# Patient Record
Sex: MALE | NOT HISPANIC OR LATINO | Employment: FULL TIME | URBAN - METROPOLITAN AREA
[De-identification: names, ages, dates, MRNs, and addresses within clinical notes are randomized per-mention and may not be internally consistent; named-entity substitution may affect disease eponyms.]

---

## 2019-08-15 ENCOUNTER — APPOINTMENT (OUTPATIENT)
Dept: GENERAL RADIOLOGY | Facility: HOSPITAL | Age: 49
End: 2019-08-15

## 2019-08-15 ENCOUNTER — HOSPITAL ENCOUNTER (EMERGENCY)
Facility: HOSPITAL | Age: 49
Discharge: HOME | End: 2019-08-15
Admitting: EMERGENCY MEDICINE

## 2019-08-15 VITALS
SYSTOLIC BLOOD PRESSURE: 137 MMHG | OXYGEN SATURATION: 96 % | TEMPERATURE: 98.4 F | HEIGHT: 72 IN | DIASTOLIC BLOOD PRESSURE: 86 MMHG | RESPIRATION RATE: 14 BRPM | BODY MASS INDEX: 40.1 KG/M2 | HEART RATE: 59 BPM | WEIGHT: 296.08 LBS

## 2019-08-15 DIAGNOSIS — M76.62 ACHILLES TENDINITIS OF LEFT LOWER EXTREMITY: Primary | ICD-10-CM

## 2019-08-15 PROCEDURE — 73590 X-RAY EXAM OF LOWER LEG: CPT

## 2019-08-15 PROCEDURE — 99283 EMERGENCY DEPT VISIT LOW MDM: CPT

## 2019-08-15 RX ORDER — HYDROCODONE BITARTRATE AND ACETAMINOPHEN 7.5; 325 MG/1; MG/1
1 TABLET ORAL ONCE
Status: COMPLETED | OUTPATIENT
Start: 2019-08-15 | End: 2019-08-15

## 2019-08-15 RX ORDER — METHYLPREDNISOLONE 4 MG/1
TABLET ORAL
Qty: 21 TABLET | Refills: 0 | OUTPATIENT
Start: 2019-08-15 | End: 2020-08-10

## 2019-08-15 RX ADMIN — HYDROCODONE BITARTRATE AND ACETAMINOPHEN 1 TABLET: 7.5; 325 TABLET ORAL at 13:15

## 2019-08-15 NOTE — DISCHARGE INSTRUCTIONS
Wear Ace wrap as directed.  Apply heat every 2 hours while awake, on for 20 minutes.  Take Medrol Dosepak as prescribed.  Follow-up with PCP for primary care needs.  Follow-up with orthopedics if no improvement in the next week.  Return to the ER for new or worsening symptoms.

## 2019-08-15 NOTE — ED NOTES
"Left ankle injury s/p \"rolling down a hill\" x 4-5 days.  Able to bear weight with pain.  Sts unable to wear safety boot for work.     Jazmyn Hunter RN  08/15/19 5296    "

## 2020-02-09 ENCOUNTER — APPOINTMENT (OUTPATIENT)
Dept: GENERAL RADIOLOGY | Facility: HOSPITAL | Age: 50
End: 2020-02-09

## 2020-02-09 ENCOUNTER — HOSPITAL ENCOUNTER (EMERGENCY)
Facility: HOSPITAL | Age: 50
Discharge: HOME | End: 2020-02-09
Admitting: EMERGENCY MEDICINE

## 2020-02-09 VITALS
BODY MASS INDEX: 41.51 KG/M2 | SYSTOLIC BLOOD PRESSURE: 138 MMHG | WEIGHT: 306.44 LBS | HEART RATE: 69 BPM | RESPIRATION RATE: 19 BRPM | OXYGEN SATURATION: 98 % | DIASTOLIC BLOOD PRESSURE: 78 MMHG | HEIGHT: 72 IN | TEMPERATURE: 98 F

## 2020-02-09 DIAGNOSIS — M25.522 LEFT ELBOW PAIN: Primary | ICD-10-CM

## 2020-02-09 DIAGNOSIS — M10.9 GOUT OF LEFT ELBOW, UNSPECIFIED CAUSE, UNSPECIFIED CHRONICITY: ICD-10-CM

## 2020-02-09 PROCEDURE — 99282 EMERGENCY DEPT VISIT SF MDM: CPT

## 2020-02-09 PROCEDURE — 73070 X-RAY EXAM OF ELBOW: CPT

## 2020-02-09 RX ORDER — KETOROLAC TROMETHAMINE 30 MG/ML
30 INJECTION, SOLUTION INTRAMUSCULAR; INTRAVENOUS ONCE
Status: DISCONTINUED | OUTPATIENT
Start: 2020-02-09 | End: 2020-02-09

## 2020-02-09 RX ORDER — INDOMETHACIN 25 MG/1
50 CAPSULE ORAL
Qty: 42 CAPSULE | Refills: 0 | Status: SHIPPED | OUTPATIENT
Start: 2020-02-09 | End: 2020-02-16

## 2020-02-09 RX ORDER — SODIUM CHLORIDE 0.9 % (FLUSH) 0.9 %
10 SYRINGE (ML) INJECTION AS NEEDED
Status: DISCONTINUED | OUTPATIENT
Start: 2020-02-09 | End: 2020-02-09

## 2020-02-09 NOTE — DISCHARGE INSTRUCTIONS
Take indomethacin as prescribed.  Be sure to take full course.    Follow-up with your primary care provider in 3-5 days.  If you do not have a primary care provider call [phone] SOURCE for help in finding one, or you may follow up with [facility] at [phone].    Return to ED for any new or worsening symptoms

## 2020-02-09 NOTE — ED PROVIDER NOTES
"Subjective   History of Present Illness  Patient is a 49-year-old male [facility] significant for gout who presents with complaints of \"gouty attack in left elbow\" they first noticed 2 days ago.  Patient describes the pain as a constant throbbing type pain that he rates a 6/10 severity.  Denies any radiation of the pain from his elbow states is worse with certain movements better with rest.  Patient states he is taken no medications for symptoms.  He denies any fever nausea vomiting body aches or chills chest pain or shortness of breath.  Does report some warmth to his elbow that is noticed but denies any redness.  Patient states that he does have a history of gout in this elbow has been off of his indomethacin for many years now has not had a gouty attack in 2 years.  Review of Systems   Constitutional: Negative.    Respiratory: Negative.    Cardiovascular: Negative.    Gastrointestinal: Positive for abdominal pain. Negative for nausea and vomiting.   Musculoskeletal: Positive for arthralgias and joint swelling. Negative for neck pain and neck stiffness.   Skin: Negative for color change, rash and wound.   Neurological: Negative.        Past Medical History:   Diagnosis Date   • Alopecia    • Edema    • Fatigue    • Gout    • Joint pain    • AMARJIT (obstructive sleep apnea)        No Known Allergies    Past Surgical History:   Procedure Laterality Date   • TONSILLECTOMY         Family History   Problem Relation Age of Onset   • Heart attack Mother    • Cancer Father    • Cancer Maternal Grandmother    • Sleep apnea Paternal Grandmother    • Heart disease Paternal Grandmother    • Stroke Paternal Grandmother    • Obesity Paternal Grandfather    • Heart attack Paternal Grandfather    • Heart disease Paternal Grandfather    • Sleep apnea Paternal Grandfather        Social History     Socioeconomic History   • Marital status:      Spouse name: Not on file   • Number of children: Not on file   • Years of education: Not on " "file   • Highest education level: Not on file   Tobacco Use   • Smoking status: Never Smoker   • Smokeless tobacco: Never Used   Substance and Sexual Activity   • Alcohol use: Yes     Comment: Occasionally   • Drug use: Never   • Sexual activity: Defer           Objective   Physical Exam   Constitutional: He is oriented to person, place, and time. He appears well-developed and well-nourished. No distress.   HENT:   Head: Normocephalic and atraumatic.   Mouth/Throat: Oropharynx is clear and moist.   Eyes: Pupils are equal, round, and reactive to light. EOM are normal. No scleral icterus.   Cardiovascular: Normal rate, regular rhythm, normal heart sounds and intact distal pulses. Exam reveals no gallop and no friction rub.   No murmur heard.  Pulmonary/Chest: Effort normal and breath sounds normal. No stridor. He has no wheezes. He has no rales.   Musculoskeletal: He exhibits tenderness. He exhibits no deformity.        Left shoulder: Normal.        Right elbow: Normal.       Left elbow: He exhibits decreased range of motion and swelling. He exhibits no deformity and no laceration. Tenderness found. Olecranon process tenderness noted.        Left wrist: He exhibits normal range of motion, no tenderness, no swelling, no effusion, no crepitus and no laceration.   Peripheral pulses intact in upper extremities compartments are soft swelling or warmth noted to the left elbow compared to the right no overlying erythema.   Neurological: He is alert and oriented to person, place, and time. No cranial nerve deficit or sensory deficit.   Skin: Skin is warm. Capillary refill takes less than 2 seconds. No rash noted. He is not diaphoretic. No erythema. No pallor.   Psychiatric: He has a normal mood and affect. His behavior is normal.   Nursing note and vitals reviewed.      Procedures           ED Course    /89   Pulse 71   Temp 98.1 °F (36.7 °C) (Oral)   Resp 18   Ht 182.9 cm (72\")   Wt (!) 139 kg (306 lb 7 oz)   " SpO2 97%   BMI 41.56 kg/m²   Medications - No data to display  Labs Reviewed - No data to display.Xr Elbow 2 View Left    Result Date: 2/9/2020  Normal elbow radiograph exam.  Electronically Signed By-Jerry Kern Jr. On:2/9/2020 2:17 PM This report was finalized on [id number] by  Jerry Kern Jr., .                                               MDM  Number of Diagnoses or Management Options  Gout of left elbow, unspecified cause, unspecified chronicity:   Left elbow pain:   Diagnosis management comments: Chart Review:  Comorbidity: Gout hypertension  Differentials: Fracture dislocation gout contusion sprain strain effusion septic arthritis     ;this list is not all inclusive and does not constitute the entirety of considered causes    Labs: Patient refused uric acid ESR sed rate  Imaging: Was interpreted by physician and reviewed by myself:  Xr Elbow 2 View Left  Result Date: 2/9/2020  Normal elbow radiograph exam.  Electronically Signed By-Jerry Kern Jr. On:2/9/2020 2:17 PM This report was finalized on [id number] by  Jerry Kern Jr., .    Disposition/Treatment:  While in the ED patient refused blood work and pain medication stating that he knows this is his gout and he just wants a refill on his medication risk and benefits were discussed with the patient in regards to this etc. showed no acute osseous abnormalities he was afebrile and appeared nontoxic patient was given prescription for indomethacin.  Lab results and findings were discussed with the patient and family at bedside who voiced understanding of discharge instructions along with signs and symptoms requiring return to the ED.  Upon discharged patient was in stable condition with followup for a revaluation.  D       Amount and/or Complexity of Data Reviewed  Tests in the radiology section of CPT®: reviewed        Final diagnoses:   Left elbow pain   Gout of left elbow, unspecified cause, unspecified chronicity            Agueda Crystal,  PA  02/09/20 1458

## 2020-02-09 NOTE — ED NOTES
Pt reports he does not want lab work and IV if it can be avoided. Spoke with PA.     Nadia Weaver, QUIANA  02/09/20 2886

## 2020-10-17 ENCOUNTER — HOSPITAL ENCOUNTER (EMERGENCY)
Facility: HOSPITAL | Age: 50
Discharge: HOME | End: 2020-10-17
Admitting: EMERGENCY MEDICINE

## 2020-10-17 VITALS
SYSTOLIC BLOOD PRESSURE: 134 MMHG | HEART RATE: 87 BPM | WEIGHT: 315 LBS | OXYGEN SATURATION: 95 % | HEIGHT: 72 IN | BODY MASS INDEX: 42.66 KG/M2 | DIASTOLIC BLOOD PRESSURE: 90 MMHG | RESPIRATION RATE: 16 BRPM | TEMPERATURE: 98 F

## 2020-10-17 DIAGNOSIS — M25.562 ACUTE PAIN OF LEFT KNEE: Primary | ICD-10-CM

## 2020-10-17 DIAGNOSIS — M25.522 LEFT ELBOW PAIN: ICD-10-CM

## 2020-10-17 PROCEDURE — 99282 EMERGENCY DEPT VISIT SF MDM: CPT

## 2020-10-17 RX ORDER — INDOMETHACIN 50 MG/1
50 CAPSULE ORAL
Qty: 30 CAPSULE | Refills: 0 | Status: SHIPPED | OUTPATIENT
Start: 2020-10-17 | End: 2020-10-27

## 2020-10-17 NOTE — DISCHARGE INSTRUCTIONS
Return to the ER for any worsening symptoms.  Follow-up with primary care provider if you do not have 1 see the above referrals.  Would recommend following up in order to discuss initiating treatment for recurrent gout once you are out of your acute episode

## 2020-10-17 NOTE — ED PROVIDER NOTES
Subjective   History: Patient is a 50-year-old male who presents to the ER with left knee and left elbow pain.  He reports that he has a history of gout and he has flareups about once a month now they have started increasing he has not seen a primary care provider for this but has been diagnosed with gout before he reports that normally it starts in his left foot and moves upward so this is slightly different initial onset but it feels very similar.  Denies any fevers chills nausea or vomiting denies any joint tenderness or warmth to touch.      Onset: Several days  Location: Left knee and elbow  Duration: Constant  Character: Sharp pain  Aggravating/Alleviating factors: None  Radiation none  Severity: Moderate            Review of Systems   Constitutional: Negative for chills, diaphoresis, fatigue and fever.   Respiratory: Negative for cough, choking and shortness of breath.    Cardiovascular: Negative for chest pain.   Gastrointestinal: Negative for abdominal distention, abdominal pain, nausea and vomiting.   Genitourinary: Negative.    Musculoskeletal: Negative for joint swelling.        Left knee pain  Left elbow pain   Skin: Negative.    Neurological: Negative.    Psychiatric/Behavioral: Negative.        Past Medical History:   Diagnosis Date   • Alopecia    • Edema    • Fatigue    • Gout    • Joint pain    • AMARJIT (obstructive sleep apnea)        No Known Allergies    Past Surgical History:   Procedure Laterality Date   • TONSILLECTOMY         Family History   Problem Relation Age of Onset   • Heart attack Mother    • Cancer Father    • Cancer Maternal Grandmother    • Sleep apnea Paternal Grandmother    • Heart disease Paternal Grandmother    • Stroke Paternal Grandmother    • Obesity Paternal Grandfather    • Heart attack Paternal Grandfather    • Heart disease Paternal Grandfather    • Sleep apnea Paternal Grandfather        Social History     Socioeconomic History   • Marital status:      Spouse  name: Not on file   • Number of children: Not on file   • Years of education: Not on file   • Highest education level: Not on file   Tobacco Use   • Smoking status: Never Smoker   • Smokeless tobacco: Never Used   Substance and Sexual Activity   • Alcohol use: Yes     Comment: Occasionally   • Drug use: Never   • Sexual activity: Defer           Objective   Physical Exam  Vitals signs and nursing note reviewed.   Constitutional:       Appearance: He is well-developed.   HENT:      Head: Normocephalic and atraumatic.      Nose: Nose normal.   Eyes:      Pupils: Pupils are equal, round, and reactive to light.   Neck:      Musculoskeletal: Normal range of motion.   Pulmonary:      Effort: Pulmonary effort is normal.   Musculoskeletal: Normal range of motion.      Comments: No obvious edema noted to left elbow or left knee.  Not warm to touch no erythema.  Patient reports that he is able to ambulate without difficulty.   Skin:     General: Skin is warm and dry.   Neurological:      General: No focal deficit present.      Mental Status: He is alert and oriented to person, place, and time.   Psychiatric:         Mood and Affect: Mood normal.         Behavior: Behavior normal.         Thought Content: Thought content normal.         Judgment: Judgment normal.         Procedures           ED Course           No radiology results for the last day  Labs Reviewed - No data to display  Medications   predniSONE (DELTASONE) tablet 60 mg (has no administration in time range)                                     MDM  Number of Diagnoses or Management Options  Acute pain of left knee:   Left elbow pain:   Diagnosis management comments: I examined the patient using the appropriate personal protective equipment.      DISPOSITION:   Chart Review:  Comorbidity:  has a past medical history of Alopecia, Edema, Fatigue, Gout, Joint pain, and AMARJIT (obstructive sleep apnea).  Differentials:this list is not all inclusive and does not constitute  the entirety of considered causes --> Gout flareup, septic arthritis, muscular strain,    Imaging: Was interpreted by physician and reviewed by myself:  No radiology results for the last day    Disposition/Treatment:    Patient is a 50-year-old male who presents to the ER with left knee and elbow pain he reports that he feels like this is a gout flareup he has a history of gout he was given a dose of prednisone here in the ER and then started on indomethacin he was told to follow-up primary care provider for possible initiation of allopurinol in the outpatient setting.  He was stable at time of discharge.  Return precaution follow-up instructions provided and patient was in agreement with plan.    Patient Progress  Patient progress: stable      Final diagnoses:   Acute pain of left knee   Left elbow pain            Jennifer Naylor PA-C  10/17/20 1837

## 2021-01-31 ENCOUNTER — HOSPITAL ENCOUNTER (EMERGENCY)
Facility: HOSPITAL | Age: 51
Discharge: HOME | End: 2021-01-31
Admitting: EMERGENCY MEDICINE

## 2021-01-31 VITALS
OXYGEN SATURATION: 94 % | HEART RATE: 90 BPM | SYSTOLIC BLOOD PRESSURE: 138 MMHG | WEIGHT: 315 LBS | HEIGHT: 72 IN | RESPIRATION RATE: 16 BRPM | BODY MASS INDEX: 42.66 KG/M2 | DIASTOLIC BLOOD PRESSURE: 88 MMHG | TEMPERATURE: 98.3 F

## 2021-01-31 DIAGNOSIS — M10.9 GOUT OF BIG TOE: Primary | ICD-10-CM

## 2021-01-31 PROCEDURE — 99282 EMERGENCY DEPT VISIT SF MDM: CPT

## 2021-01-31 RX ORDER — INDOMETHACIN 50 MG/1
50 CAPSULE ORAL
Qty: 30 CAPSULE | Refills: 0 | Status: SHIPPED | OUTPATIENT
Start: 2021-01-31 | End: 2021-02-10

## 2021-02-01 ENCOUNTER — EPISODE CHANGES (OUTPATIENT)
Dept: CASE MANAGEMENT | Facility: OTHER | Age: 51
End: 2021-02-01

## 2021-03-14 ENCOUNTER — HOSPITAL ENCOUNTER (EMERGENCY)
Facility: HOSPITAL | Age: 51
Discharge: HOME | End: 2021-03-14
Attending: EMERGENCY MEDICINE | Admitting: EMERGENCY MEDICINE

## 2021-03-14 ENCOUNTER — APPOINTMENT (OUTPATIENT)
Dept: GENERAL RADIOLOGY | Facility: HOSPITAL | Age: 51
End: 2021-03-14

## 2021-03-14 VITALS
WEIGHT: 315 LBS | SYSTOLIC BLOOD PRESSURE: 160 MMHG | OXYGEN SATURATION: 98 % | BODY MASS INDEX: 42.66 KG/M2 | HEIGHT: 72 IN | DIASTOLIC BLOOD PRESSURE: 82 MMHG | RESPIRATION RATE: 18 BRPM | HEART RATE: 64 BPM | TEMPERATURE: 98.4 F

## 2021-03-14 DIAGNOSIS — M10.9 ACUTE GOUT OF LEFT FOOT, UNSPECIFIED CAUSE: ICD-10-CM

## 2021-03-14 DIAGNOSIS — M79.672 LEFT FOOT PAIN: Primary | ICD-10-CM

## 2021-03-14 LAB
ALBUMIN SERPL-MCNC: 3.9 G/DL (ref 3.5–5.2)
ALBUMIN/GLOB SERPL: 1.2 G/DL
ALP SERPL-CCNC: 85 U/L (ref 39–117)
ALT SERPL W P-5'-P-CCNC: 19 U/L (ref 1–41)
ANION GAP SERPL CALCULATED.3IONS-SCNC: 10 MMOL/L (ref 5–15)
AST SERPL-CCNC: 19 U/L (ref 1–40)
BASOPHILS # BLD AUTO: 0.1 10*3/MM3 (ref 0–0.2)
BASOPHILS NFR BLD AUTO: 0.5 % (ref 0–1.5)
BILIRUB SERPL-MCNC: 0.8 MG/DL (ref 0–1.2)
BUN SERPL-MCNC: 13 MG/DL (ref 6–20)
BUN/CREAT SERPL: 13.8 (ref 7–25)
CALCIUM SPEC-SCNC: 8.7 MG/DL (ref 8.6–10.5)
CHLORIDE SERPL-SCNC: 104 MMOL/L (ref 98–107)
CO2 SERPL-SCNC: 27 MMOL/L (ref 22–29)
CREAT SERPL-MCNC: 0.94 MG/DL (ref 0.76–1.27)
DEPRECATED RDW RBC AUTO: 41.6 FL (ref 37–54)
EOSINOPHIL # BLD AUTO: 0.5 10*3/MM3 (ref 0–0.4)
EOSINOPHIL NFR BLD AUTO: 4.4 % (ref 0.3–6.2)
ERYTHROCYTE [DISTWIDTH] IN BLOOD BY AUTOMATED COUNT: 14.1 % (ref 12.3–15.4)
GFR SERPL CREATININE-BSD FRML MDRD: 104 ML/MIN/1.73
GFR SERPL CREATININE-BSD FRML MDRD: 86 ML/MIN/1.73
GLOBULIN UR ELPH-MCNC: 3.3 GM/DL
GLUCOSE SERPL-MCNC: 83 MG/DL (ref 65–99)
HCT VFR BLD AUTO: 39.9 % (ref 37.5–51)
HGB BLD-MCNC: 13.9 G/DL (ref 13–17.7)
LYMPHOCYTES # BLD AUTO: 3.6 10*3/MM3 (ref 0.7–3.1)
LYMPHOCYTES NFR BLD AUTO: 34.2 % (ref 19.6–45.3)
MCH RBC QN AUTO: 28.8 PG (ref 26.6–33)
MCHC RBC AUTO-ENTMCNC: 35 G/DL (ref 31.5–35.7)
MCV RBC AUTO: 82.3 FL (ref 79–97)
MONOCYTES # BLD AUTO: 0.8 10*3/MM3 (ref 0.1–0.9)
MONOCYTES NFR BLD AUTO: 7.5 % (ref 5–12)
NEUTROPHILS NFR BLD AUTO: 5.6 10*3/MM3 (ref 1.7–7)
NEUTROPHILS NFR BLD AUTO: 53.4 % (ref 42.7–76)
NRBC BLD AUTO-RTO: 0.1 /100 WBC (ref 0–0.2)
PLATELET # BLD AUTO: 195 10*3/MM3 (ref 140–450)
PMV BLD AUTO: 8.9 FL (ref 6–12)
POTASSIUM SERPL-SCNC: 3.8 MMOL/L (ref 3.5–5.2)
PROT SERPL-MCNC: 7.2 G/DL (ref 6–8.5)
RBC # BLD AUTO: 4.84 10*6/MM3 (ref 4.14–5.8)
SODIUM SERPL-SCNC: 141 MMOL/L (ref 136–145)
URATE SERPL-MCNC: 8.5 MG/DL (ref 3.4–7)
WBC # BLD AUTO: 10.5 10*3/MM3 (ref 3.4–10.8)

## 2021-03-14 PROCEDURE — 80053 COMPREHEN METABOLIC PANEL: CPT | Performed by: EMERGENCY MEDICINE

## 2021-03-14 PROCEDURE — 99284 EMERGENCY DEPT VISIT MOD MDM: CPT

## 2021-03-14 PROCEDURE — 63710000001 ONDANSETRON ODT 4 MG TABLET DISPERSIBLE: Performed by: EMERGENCY MEDICINE

## 2021-03-14 PROCEDURE — 85025 COMPLETE CBC W/AUTO DIFF WBC: CPT | Performed by: EMERGENCY MEDICINE

## 2021-03-14 PROCEDURE — 73630 X-RAY EXAM OF FOOT: CPT

## 2021-03-14 PROCEDURE — 84550 ASSAY OF BLOOD/URIC ACID: CPT | Performed by: EMERGENCY MEDICINE

## 2021-03-14 RX ORDER — COLCHICINE 0.6 MG/1
0.6 TABLET ORAL ONCE
Status: COMPLETED | OUTPATIENT
Start: 2021-03-14 | End: 2021-03-14

## 2021-03-14 RX ORDER — ONDANSETRON 4 MG/1
4 TABLET, ORALLY DISINTEGRATING ORAL ONCE
Status: COMPLETED | OUTPATIENT
Start: 2021-03-14 | End: 2021-03-14

## 2021-03-14 RX ORDER — HYDROCODONE BITARTRATE AND ACETAMINOPHEN 5; 325 MG/1; MG/1
1 TABLET ORAL ONCE AS NEEDED
Status: COMPLETED | OUTPATIENT
Start: 2021-03-14 | End: 2021-03-14

## 2021-03-14 RX ORDER — NAPROXEN 375 MG/1
375 TABLET ORAL 2 TIMES DAILY PRN
Qty: 14 TABLET | Refills: 0 | OUTPATIENT
Start: 2021-03-14 | End: 2021-04-20

## 2021-03-14 RX ORDER — COLCHICINE 0.6 MG/1
1.2 TABLET ORAL ONCE
Status: COMPLETED | OUTPATIENT
Start: 2021-03-14 | End: 2021-03-14

## 2021-03-14 RX ADMIN — COLCHICINE 1.2 MG: 0.6 TABLET, FILM COATED ORAL at 19:08

## 2021-03-14 RX ADMIN — COLCHICINE 0.6 MG: 0.6 TABLET, FILM COATED ORAL at 20:00

## 2021-03-14 RX ADMIN — ONDANSETRON 4 MG: 4 TABLET, ORALLY DISINTEGRATING ORAL at 18:05

## 2021-03-14 RX ADMIN — HYDROCODONE BITARTRATE AND ACETAMINOPHEN 1 TABLET: 5; 325 TABLET ORAL at 18:05

## 2021-03-14 NOTE — ED NOTES
Pt reports pain in his left big toe and some swelling that has been tx with ice at home        Micaela Wells RN  03/14/21 9780

## 2021-03-15 ENCOUNTER — PATIENT OUTREACH (OUTPATIENT)
Dept: CASE MANAGEMENT | Facility: OTHER | Age: 51
End: 2021-03-15

## 2021-03-15 NOTE — ED PROVIDER NOTES
Subjective   Chief complaint left foot pain gout    History present is 42-year-old male with a history of gout complains of some pain and swelling in his left big toe and midfoot started a couple days ago is progressed got worse monitored severe worse with movement better with rest and nonradiating continuous.  No associated fever chills or injury.  No recent long car ride plane or immobilization.  Patient states he usually takes Naprosyn to help him.  Chronically on allopurinol          Review of Systems   Constitutional: Negative for chills and fever.   Respiratory: Negative for chest tightness and shortness of breath.    Gastrointestinal: Negative for abdominal pain and vomiting.   Genitourinary: Negative for difficulty urinating and dysuria.   Musculoskeletal: Positive for arthralgias. Negative for neck pain.   Skin: Negative for color change and rash.   Psychiatric/Behavioral: Negative for agitation and behavioral problems.       Past Medical History:   Diagnosis Date   • Alopecia    • Edema    • Fatigue    • Gout    • Joint pain    • AMARJIT (obstructive sleep apnea)        No Known Allergies    Past Surgical History:   Procedure Laterality Date   • TONSILLECTOMY         Family History   Problem Relation Age of Onset   • Heart attack Mother    • Cancer Father    • Cancer Maternal Grandmother    • Sleep apnea Paternal Grandmother    • Heart disease Paternal Grandmother    • Stroke Paternal Grandmother    • Obesity Paternal Grandfather    • Heart attack Paternal Grandfather    • Heart disease Paternal Grandfather    • Sleep apnea Paternal Grandfather        Social History     Socioeconomic History   • Marital status:      Spouse name: Not on file   • Number of children: Not on file   • Years of education: Not on file   • Highest education level: Not on file   Tobacco Use   • Smoking status: Never Smoker   • Smokeless tobacco: Never Used   Substance and Sexual Activity   • Alcohol use: Yes     Comment:  Occasionally   • Drug use: Never   • Sexual activity: Defer   Social history no tobacco alcohol or drug use    Prior to Admission medications    Medication Sig Start Date End Date Taking? Authorizing Provider   naproxen (NAPROSYN) 375 MG tablet Take 1 tablet by mouth 2 (Two) Times a Day As Needed for Mild Pain  or Moderate Pain . 3/14/21   Manohar Lora MD       Patient reports taking allopurinol  Objective   Physical Exam  50-year-old male awake alert no distress examination of foot reveals some tenderness at the tarsal phalangeal joint of the left great toe and at the midfoot is not red or hot or fevers there is no crepitus or subtendinous area pain is moderate degree.  No rash.  Foot is warm and dry good cap refill good and equal dorsalis pedis and posterior tibialis pulses noted no ankle pain no knee pain no palp cords or Homans' sign squeezing the calf is downgoing no Achilles pain he moves everything without difficulty no evidence of cellulitis or DVT no evidence of septic joint.  Patient is able to move everything without difficulty.  The bottom of the foot looks normal there is no redness or swelling to the bottom of the foot  Procedures           ED Course      Results for orders placed or performed during the hospital encounter of 03/14/21   Comprehensive Metabolic Panel    Specimen: Arm, Right; Blood   Result Value Ref Range    Glucose 83 65 - 99 mg/dL    BUN 13 6 - 20 mg/dL    Creatinine 0.94 0.76 - 1.27 mg/dL    Sodium 141 136 - 145 mmol/L    Potassium 3.8 3.5 - 5.2 mmol/L    Chloride 104 98 - 107 mmol/L    CO2 27.0 22.0 - 29.0 mmol/L    Calcium 8.7 8.6 - 10.5 mg/dL    Total Protein 7.2 6.0 - 8.5 g/dL    Albumin 3.90 3.50 - 5.20 g/dL    ALT (SGPT) 19 1 - 41 U/L    AST (SGOT) 19 1 - 40 U/L    Alkaline Phosphatase 85 39 - 117 U/L    Total Bilirubin 0.8 0.0 - 1.2 mg/dL    eGFR Non African Amer 86 >60 mL/min/1.73    eGFR  African Amer 104 >60 mL/min/1.73    Globulin 3.3 gm/dL    A/G Ratio 1.2 g/dL     BUN/Creatinine Ratio 13.8 7.0 - 25.0    Anion Gap 10.0 5.0 - 15.0 mmol/L   Uric Acid    Specimen: Arm, Right; Blood   Result Value Ref Range    Uric Acid 8.5 (H) 3.4 - 7.0 mg/dL   CBC Auto Differential    Specimen: Arm, Right; Blood   Result Value Ref Range    WBC 10.50 3.40 - 10.80 10*3/mm3    RBC 4.84 4.14 - 5.80 10*6/mm3    Hemoglobin 13.9 13.0 - 17.7 g/dL    Hematocrit 39.9 37.5 - 51.0 %    MCV 82.3 79.0 - 97.0 fL    MCH 28.8 26.6 - 33.0 pg    MCHC 35.0 31.5 - 35.7 g/dL    RDW 14.1 12.3 - 15.4 %    RDW-SD 41.6 37.0 - 54.0 fl    MPV 8.9 6.0 - 12.0 fL    Platelets 195 140 - 450 10*3/mm3    Neutrophil % 53.4 42.7 - 76.0 %    Lymphocyte % 34.2 19.6 - 45.3 %    Monocyte % 7.5 5.0 - 12.0 %    Eosinophil % 4.4 0.3 - 6.2 %    Basophil % 0.5 0.0 - 1.5 %    Neutrophils, Absolute 5.60 1.70 - 7.00 10*3/mm3    Lymphocytes, Absolute 3.60 (H) 0.70 - 3.10 10*3/mm3    Monocytes, Absolute 0.80 0.10 - 0.90 10*3/mm3    Eosinophils, Absolute 0.50 (H) 0.00 - 0.40 10*3/mm3    Basophils, Absolute 0.10 0.00 - 0.20 10*3/mm3    nRBC 0.1 0.0 - 0.2 /100 WBC     XR Foot 3+ View Left    Result Date: 3/14/2021  No acute findings.  Electronically Signed By-Handy John MD On:3/14/2021 6:33 PM This report was finalized on [id number] by  Handy John MD.    Medications   HYDROcodone-acetaminophen (NORCO) 5-325 MG per tablet 1 tablet (1 tablet Oral Given 3/14/21 1805)   ondansetron ODT (ZOFRAN-ODT) disintegrating tablet 4 mg (4 mg Oral Given 3/14/21 1805)   colchicine tablet 1.2 mg (1.2 mg Oral Given 3/14/21 1908)   colchicine tablet 0.6 mg (0.6 mg Oral Given 3/14/21 2000)                                            MDM  Number of Diagnoses or Management Options  Acute gout of left foot, unspecified cause  Left foot pain  Diagnosis management comments: Medical decision making.  Patient had the above exam evaluation examination of that foot patient's uric acid was 8.5 CBC electrolytes unremarkable x-ray shows some arthritic changes but no acute  findings.  On clinical exam I do not see any evidence to suggest acute DVT or pulmonary embolism or septic joint or cellulitis or necrotizing fasciitis there is no pain out of proportion to exam no pain with passive stretching to suggest compartment syndrome.  No pain to the bottom of the foot suggest an infection of the bottom of the foot as well.  At this point has a long history of gout we will treat him as such he was given colchicine 1.2 mg p.o. and then 0.6 mg p.o. 1 hour later.  On repeat exam he was resting comfortably is feeling better resting comfortably.  We talked about the findings and what to return for.  He will be discharged home for outpatient management patient's pharmacy is closed so we did print out Naprosyn for him to take home with him tonight.       Amount and/or Complexity of Data Reviewed  Clinical lab tests: reviewed  Tests in the radiology section of CPT®: reviewed        Final diagnoses:   Left foot pain   Acute gout of left foot, unspecified cause            Manohar Lora MD  03/14/21 2018

## 2021-03-15 NOTE — OUTREACH NOTE
Patient Outreach Note    Spoke with pt for f/u from ER visit. Pt reports he is still having a little pain, but back at work today. Denies questions regarding meds. Does plan to call doctor's that were listed in his ER AVS. ECM encouraged pt to contact for any questions or concerns related to getting an appt or f/u. Also discussed low purine diet - pt states he is very educated on this - recently switched to plan based diet. Reviewed foods (referenced Epic education tool) that are recommended to avoid and increase related to uric acid and gout. Pt v/u.     Pt has good family support. Education provided for HRCM  program.Pt declined needs at this time. Pt thanks RN-ECM for calling. Advised pt to call RN-ECM or [facility] nurse line with any needs. Follow up outreach as needed.        Karrie Vallejo RN  Ambulatory     3/15/2021, 12:52 EDT